# Patient Record
Sex: MALE | Race: WHITE | NOT HISPANIC OR LATINO | URBAN - METROPOLITAN AREA
[De-identification: names, ages, dates, MRNs, and addresses within clinical notes are randomized per-mention and may not be internally consistent; named-entity substitution may affect disease eponyms.]

---

## 2022-06-15 ENCOUNTER — OFFICE VISIT (OUTPATIENT)
Dept: URBAN - METROPOLITAN AREA CLINIC 60 | Facility: CLINIC | Age: 37
End: 2022-06-15

## 2022-07-01 ENCOUNTER — DASHBOARD ENCOUNTERS (OUTPATIENT)
Age: 37
End: 2022-07-01

## 2022-07-06 ENCOUNTER — OFFICE VISIT (OUTPATIENT)
Dept: URBAN - METROPOLITAN AREA CLINIC 60 | Facility: CLINIC | Age: 37
End: 2022-07-06

## 2022-07-06 NOTE — HPI-TODAY'S VISIT:
36-year-old male presented to the office 3 weeks ago reporting a lump that he noticed between his sternum and umbilicus.  He would usually only feel this when in the standing position and would not feel it when lying down.  He reported some discomfort with coughing on 1 occasion but otherwise had no abdominal pain.  He had no other complaints.  He was sent for a CT scan of the abdomen and pelvis which was done on Tabatha 15, 2022.  His CT demonstrated a small fat-containing ventral hernia within the midline superior to the umbilicus.  The hernia mouth measured 9 mm in maximum diameter.  Very small fat-containing bilateral inguinal hernias were also noted.  He was referred to general surgery for consultation.  He was incidentally noted on CT to have a right hepatic dome hemangioma and nonspecific hypodense mass within the caudate lobe.  MRI of the liver was ordered to further characterize.

## 2022-07-09 ENCOUNTER — TELEPHONE ENCOUNTER (OUTPATIENT)
Dept: URBAN - METROPOLITAN AREA CLINIC 121 | Facility: CLINIC | Age: 37
End: 2022-07-09

## 2022-07-10 ENCOUNTER — TELEPHONE ENCOUNTER (OUTPATIENT)
Dept: URBAN - METROPOLITAN AREA CLINIC 121 | Facility: CLINIC | Age: 37
End: 2022-07-10